# Patient Record
Sex: MALE | Race: BLACK OR AFRICAN AMERICAN | NOT HISPANIC OR LATINO | URBAN - METROPOLITAN AREA
[De-identification: names, ages, dates, MRNs, and addresses within clinical notes are randomized per-mention and may not be internally consistent; named-entity substitution may affect disease eponyms.]

---

## 2019-09-12 ENCOUNTER — HISTORICAL (OUTPATIENT)
Dept: ADMINISTRATIVE | Facility: HOSPITAL | Age: 39
End: 2019-09-12

## 2019-09-15 LAB — FINAL CULTURE: NORMAL

## 2021-02-04 ENCOUNTER — HISTORICAL (OUTPATIENT)
Dept: ADMINISTRATIVE | Facility: HOSPITAL | Age: 41
End: 2021-02-04

## 2021-02-06 LAB — FINAL CULTURE: NORMAL

## 2022-04-11 ENCOUNTER — HISTORICAL (OUTPATIENT)
Dept: ADMINISTRATIVE | Facility: HOSPITAL | Age: 42
End: 2022-04-11

## 2022-04-24 VITALS
SYSTOLIC BLOOD PRESSURE: 116 MMHG | WEIGHT: 190.06 LBS | HEIGHT: 68 IN | BODY MASS INDEX: 28.8 KG/M2 | DIASTOLIC BLOOD PRESSURE: 86 MMHG

## 2023-03-15 DIAGNOSIS — E23.2 DIABETES INSIPIDUS: Primary | ICD-10-CM

## 2023-03-21 ENCOUNTER — TELEPHONE (OUTPATIENT)
Dept: NEUROSURGERY | Facility: CLINIC | Age: 43
End: 2023-03-21
Payer: MEDICAID

## 2023-03-21 DIAGNOSIS — D49.7 PITUITARY TUMOR: Primary | ICD-10-CM

## 2023-03-21 NOTE — TELEPHONE ENCOUNTER
Jose called Dr. Odonnell's office to inquire about any recent imaging. Mira stated that there is no recent imaging so jose forwarded the call to me. I spoke with mira to inquire about the referral and if anything happened recently. She stated no, his mother is the one that is requesting the apt because he has not seen anyone in a while. I did request for her to see if Dr. Odonnell would order a MRI brain W/ WO. She stated she will relay the message to him. I will send myself a reminder to F/U on status of MRI.

## 2023-03-23 NOTE — TELEPHONE ENCOUNTER
Spoke with patients mother. Inquired about the MRI. She stated she is waiting for it to be scheduled. She stated she's not sure if the tumor is spreading but the patient's eye sight is getting really bad and he has no energy so she knows he needs this MRI soon. I told her I will f/u with her early next week to see if it is scheduled. She verbalized understanding.

## 2023-03-27 NOTE — TELEPHONE ENCOUNTER
Spoke with patients mother regarding MRI. She states it is not scheduled until 2024. I asked her if she told Dr. Odonnell's office that this was to see if his tumor has grown. She stated yes, they have been trying to tell him this. I will call Dr. Odonnell's office and speak with his nurse.

## 2023-03-27 NOTE — TELEPHONE ENCOUNTER
Spoke with Dulce at Dr. Odonnell's office. She stated no, they did not schedule it for next year. The only thing they had to do was get a PA for it but did send the orders to [facility]. She states she will get in touch with [facility] to confirm this. I will keep f/u with scheduling of MRI.

## 2023-03-29 NOTE — TELEPHONE ENCOUNTER
Spoke with patients mother. States [facility] did call her yesterday and advised her that the first order they received was the wrong one. She states [facility] should be giving her a call back today to schedule. I told her I will note that and f/u w/ her at the end of the week. She verbalized understanding.

## 2023-03-31 NOTE — TELEPHONE ENCOUNTER
Spoke with patients mother. Inquired about the status of MRI. She states [facility] did not call her yet and is still waiting. Advised her that I will keep F/U on this so I can send for review ASAP.

## 2023-04-03 NOTE — TELEPHONE ENCOUNTER
Patients mother gave me a call to inquire about the MRI. She was wondering if I had heard anything from Dr. Odonnell's office. I advised her that the last thing I heard was that they sent [facility] the wrong order and she was waiting to hear back from them to schedule. Advised I did not hear anything else regarding it. She states she will call Dr. Odonnell's office to ensure they did send the right order to [facility].

## 2023-04-05 NOTE — TELEPHONE ENCOUNTER
Mother returned my call. States patient is scheduled for MRI on 4/14/23. I advised her that I will send myself a reminder to f/u and send it for review. She verbalized understanding.

## 2023-04-17 NOTE — TELEPHONE ENCOUNTER
Called [facility] to get imaging from 4/14 pushed. She stated that the patient is scheduled at [facility] for 4/21/23 for upcoming MRI brain. I sent myself another reminder to f/u.

## 2023-04-24 NOTE — TELEPHONE ENCOUNTER
Spoke with someone at [facility] radiology. She states the only thing they have is from 2017. Patient did r/s MRI that was for 4/21. It is now scheduled for 5/1/23. Awaiting those images to be pushed and will send myself another reminder to f/u on results.

## 2023-05-05 ENCOUNTER — TELEPHONE (OUTPATIENT)
Dept: NEUROSURGERY | Facility: CLINIC | Age: 43
End: 2023-05-05
Payer: MEDICAID

## 2023-05-05 NOTE — TELEPHONE ENCOUNTER
Spoke with patients mother. Advised her of Donita's recommendations. She verbalized understanding so I scheduled patient for 8/7/23 at 11:00. I did give her our number so she can get our address once it gets closer.

## 2023-05-05 NOTE — TELEPHONE ENCOUNTER
Dr. Nieto reviewed the scan.  There is no recurrent tumor.  Give him a next available NP with me.  No new imaging.  I will arrange for the patient to see Dr. Huang in follow up.    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 MRI in 2015 showed tumor. Scans from 2017 (ordered by Hamida Conley) and 5/1/2023 (ordered by Dr. Odonnell). show postoperative findings. .

## 2023-10-19 ENCOUNTER — OFFICE VISIT (OUTPATIENT)
Dept: NEUROSURGERY | Facility: CLINIC | Age: 43
End: 2023-10-19
Payer: MEDICAID

## 2023-10-19 VITALS
HEIGHT: 67 IN | HEART RATE: 71 BPM | BODY MASS INDEX: 26.53 KG/M2 | SYSTOLIC BLOOD PRESSURE: 110 MMHG | DIASTOLIC BLOOD PRESSURE: 79 MMHG | WEIGHT: 169 LBS | RESPIRATION RATE: 16 BRPM

## 2023-10-19 DIAGNOSIS — D49.7 PITUITARY TUMOR: Primary | ICD-10-CM

## 2023-10-19 DIAGNOSIS — D49.7 PITUITARY TUMOR: ICD-10-CM

## 2023-10-19 PROCEDURE — 3074F SYST BP LT 130 MM HG: CPT | Mod: CPTII,,, | Performed by: PHYSICIAN ASSISTANT

## 2023-10-19 PROCEDURE — 3008F PR BODY MASS INDEX (BMI) DOCUMENTED: ICD-10-PCS | Mod: CPTII,,, | Performed by: PHYSICIAN ASSISTANT

## 2023-10-19 PROCEDURE — 99203 OFFICE O/P NEW LOW 30 MIN: CPT | Mod: ,,, | Performed by: PHYSICIAN ASSISTANT

## 2023-10-19 PROCEDURE — 3078F DIAST BP <80 MM HG: CPT | Mod: CPTII,,, | Performed by: PHYSICIAN ASSISTANT

## 2023-10-19 PROCEDURE — 1160F RVW MEDS BY RX/DR IN RCRD: CPT | Mod: CPTII,,, | Performed by: PHYSICIAN ASSISTANT

## 2023-10-19 PROCEDURE — 1159F PR MEDICATION LIST DOCUMENTED IN MEDICAL RECORD: ICD-10-PCS | Mod: CPTII,,, | Performed by: PHYSICIAN ASSISTANT

## 2023-10-19 PROCEDURE — 3008F BODY MASS INDEX DOCD: CPT | Mod: CPTII,,, | Performed by: PHYSICIAN ASSISTANT

## 2023-10-19 PROCEDURE — 3078F PR MOST RECENT DIASTOLIC BLOOD PRESSURE < 80 MM HG: ICD-10-PCS | Mod: CPTII,,, | Performed by: PHYSICIAN ASSISTANT

## 2023-10-19 PROCEDURE — 1160F PR REVIEW ALL MEDS BY PRESCRIBER/CLIN PHARMACIST DOCUMENTED: ICD-10-PCS | Mod: CPTII,,, | Performed by: PHYSICIAN ASSISTANT

## 2023-10-19 PROCEDURE — 99203 PR OFFICE/OUTPT VISIT, NEW, LEVL III, 30-44 MIN: ICD-10-PCS | Mod: ,,, | Performed by: PHYSICIAN ASSISTANT

## 2023-10-19 PROCEDURE — 1159F MED LIST DOCD IN RCRD: CPT | Mod: CPTII,,, | Performed by: PHYSICIAN ASSISTANT

## 2023-10-19 PROCEDURE — 3074F PR MOST RECENT SYSTOLIC BLOOD PRESSURE < 130 MM HG: ICD-10-PCS | Mod: CPTII,,, | Performed by: PHYSICIAN ASSISTANT

## 2023-10-19 NOTE — PROGRESS NOTES
[facility]  History & Physical  Neurosurgery      Ignacio Tamez Jr.   [id number]   1980     SUBJECTIVE:     Chief Complaint:  Visual disturbance      History of Present Illness:   Patient is a 43 y.o. right handed male is seen today for brain tumor.  In 2015, the patient developed blurry vision and headaches while he was in skilled nursing.  He was taken to [facility] for evaluation.  Imaging obtained at that time discovered pituitary tumor.  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  The patient reports that within a few months, he was seen in [place].  On 10/27/2015, he underwent transsphenoidal resection of pituitary tumor.  He was found to have a CSF leak.  He subsequently underwent repair of CSF leak on 11/19/2015.  Lumbar drain was placed at that time to aid with healing of the repair.  In reviewing the notes from care anywhere, the patient pulled out the lumbar drain.  Part of the catheter was retained within the thecal sac.  He then was found to have an infection at the lower back.  On 12/29/2015, he underwent L3-4 laminectomy with removal of the retained catheter in [place].  The patient's mother reports that he had an extensive stay in [place].    The patient has been under the care of Dr. Odonnell.  Dr. Odonnell has referred the patient here for evaluation due to a worsening in his vision and per patient request.  Previously, he was able to see enough to get around.  Approximately 1 month ago, his mother reports he lost all vision.  He is able to see light, but unable to count fingers.  He continues with headaches daily described as pounding and spinning.  The patient reports that since he has been unable to see, the spinning feels worse.  His last eye exam was a few years ago from what they were can recall.  He is awaiting an appointment to see an ophthalmologist.  He was previously seen by Dr. Hamida Menendez when she was practicing in [place].  He has not seen a neurologist  since she to [facility].    Of note, the patient reports he will no longer be seeing Dr. Odonnell.  He has an appointment with a new primary care provider, but is unable to recall the name.      Past Medical History:   Diagnosis Date    Diabetes insipidus     History of depression     HTN (hypertension)     Legally blind     Midline low back pain without sciatica     Pituitary adenoma         Past Surgical History:   Procedure Laterality Date    L3-4 LAMINECTOMY W/ INTRADURAL EXPLORATION & REMOVAL OF RETAINED CATHETER  12/29/2015    DIGIORGIO    REPAIR OF CSF LEAK- NASAL ENDOSCOPY & PLACEMENT OF GRAFT  11/19/2015    NEINER    TRANSSPHENOIDAL RESECTION OF PITUITARY TUMOR  10/27/2015    GLENN        Social History     Tobacco Use    Smoking status: Never    Smokeless tobacco: Never   Substance Use Topics    Alcohol use: Not on file        Family History   Problem Relation Age of Onset    Diabetes Mother     Glaucoma Father     Cancer Maternal Grandmother         Review of patient's allergies indicates:  No Known Allergies     Current Outpatient Medications   Medication Instructions    desmopressin (DDAVP) 0.2 MG tablet Oral, 3 times daily    ferrous sulfate (FEOSOL) 325 mg, Oral, with breakfast    hydrocortisone (CORTEF) 5 mg, Oral, 2 times daily    metFORMIN (GLUCOPHAGE) 500 mg, Oral, 2 times daily with meals       Review of Systems   Constitutional:  Positive for fatigue. Negative for chills and fever.   HENT:  Negative for nosebleeds and sore throat.    Eyes:  Positive for visual disturbance. Negative for pain.   Respiratory:  Negative for cough, chest tightness and shortness of breath.    Cardiovascular:  Negative for chest pain.   Gastrointestinal:  Negative for diarrhea, nausea and vomiting.   Genitourinary:  Negative for difficulty urinating, dysuria and hematuria.   Musculoskeletal:  Positive for gait problem. Negative for myalgias.   Skin:  Negative for rash.   Neurological:  Positive for dizziness, weakness and  "headaches. Negative for facial asymmetry.   Psychiatric/Behavioral:  Negative for confusion and sleep disturbance. The patient is not nervous/anxious.        OBJECTIVE:       Visit Vitals  /79 (BP Location: Right arm, Patient Position: Sitting)   Pulse 71   Resp 16   Ht 5' 7" (1.702 m)   Wt 76.7 kg (169 lb)   BMI 26.47 kg/m²        General:  Pleasant, Well-nourished, Well-groomed.    Head:  Normocephalic, atraumatic.  There is cataract present on the eye.    CV:  Neck is supple.  There are no carotid bruits.  Heart has regular rate and rhythm.    Lungs:  Lungs are clear to auscultation bilaterally with non-labored respirations.    Abdomen:  Soft, nontender, nondistended.    Neurological:    Oriented to Person, Place, Time   Speech: normal  Memory, cognition, and affect are appropriate.  Pupils are equal, round, and reactive to light.  Extraocular movements are intact.  Movements of facial expression are intact and symmetric.  Moving all extremities well.  Gait is requires assistance due to blindness.    Imaging:  MRI of the brain with and without contrast was obtained on 05/01/2023.  By report, this study shows stable postoperative findings when compared to scan from 10/10/2017.      ASSESSMENT:     1. Pituitary tumor  MRI Pituitary W W/O Contrast        PLAN:     Options were discussed at length with the patient and his mother.  I believe change in his vision is due to cataracts as opposed to a change in the pituitary gland.  Being the patient experienced a change in his vision in the past 1-2 months, we will obtain repeat MRI of the pituitary region to rule this out as a cause.  He will see Dr. Huang via telemedicine with that MRI.      A total of 43 minutes was spent face-to-face with the patient during this encounter.  Over half of that time was spent on counseling and coordination of care.  Additional time was used to review the patient's chart, discuss with Dr. Huang, and work on office note.  Chart " review included neurosurgery notes from [facility].        Donita Gardner PA-C      Disclaimer:  This note is prepared using voice recognition software and as such is likely to have errors despite attempts at proofreading.

## 2023-12-04 ENCOUNTER — TELEPHONE (OUTPATIENT)
Dept: NEUROSURGERY | Facility: CLINIC | Age: 43
End: 2023-12-04

## 2023-12-04 ENCOUNTER — OFFICE VISIT (OUTPATIENT)
Dept: NEUROSURGERY | Facility: CLINIC | Age: 43
End: 2023-12-04
Payer: MEDICAID

## 2023-12-04 VITALS — HEIGHT: 67 IN | WEIGHT: 169 LBS | BODY MASS INDEX: 26.53 KG/M2

## 2023-12-04 DIAGNOSIS — E89.3 STATUS POST TRANSSPHENOIDAL PITUITARY RESECTION: Primary | ICD-10-CM

## 2023-12-04 PROCEDURE — 99499 NO LOS: ICD-10-PCS | Mod: 95,,, | Performed by: STUDENT IN AN ORGANIZED HEALTH CARE EDUCATION/TRAINING PROGRAM

## 2023-12-04 PROCEDURE — 99499 UNLISTED E&M SERVICE: CPT | Mod: 95,,, | Performed by: STUDENT IN AN ORGANIZED HEALTH CARE EDUCATION/TRAINING PROGRAM

## 2023-12-04 NOTE — TELEPHONE ENCOUNTER
The patients mother called the office this morning in regards to the patients virtual he has scheduled today with Dr. Huang. She stated they are trying to log in to his account but it is saying its . I did advise her that they will not be able to check in until it gets a little closer to the appointment time. I did advise her that the MA will call them about 10 min prior to get everything updated and they can log in at that time. She stated she has no idea how to do that, she is not very good with technology. I advised her if she cannot get it to work, we can maybe just do a regular phone call. She stated that would be better. I told her I would relay this to Dr. Huang' MA so she can be aware and we will be back in touch if anything is needed.   no

## 2023-12-04 NOTE — PROGRESS NOTES
Established Patient - Audio Only Telehealth Visit     The chief complaint leading to consultation is: Visual disturbance  Visit type: Virtual visit with audio only (telephone)  Total time spent with patient: 10 min    The reason for the audio only service rather than synchronous audio and video virtual visit was related to technical difficulties or patient preference/necessity.     Each patient to whom I provide medical services by telemedicine is:  (1) informed of the relationship between the physician and patient and the respective role of any other health care provider with respect to management of the patient; and (2) notified that they may decline to receive medical services by telemedicine and may withdraw from such care at any time. Patient verbally consented to receive this service via voice-only telephone call.    HPI: Patient continues with visual complaints. No vision on R (baseline). L eye vision has gotten worse. Assessed by ophthalmologist, scheduled for cataract surgery in April.   MRI pituitary was reviewed. Overall stable compared to 2017.     Assessment and plan:  44yo M w history of transsphenoidal resection of pituitary tumor. Worsening vision likely related to cataract. Ophthalmology planning on Surgery.  MRI pituitary with stable findings. Will see him back in 6 months with repeat pituitary MRI.                        This service was not originating from a related E/M service provided within the previous 7 days nor will  to an E/M service or procedure within the next 24 hours or my soonest available appointment.  Prevailing standard of care was able to be met in this audio-only visit.

## 2023-12-20 ENCOUNTER — TELEPHONE (OUTPATIENT)
Dept: NEUROSURGERY | Facility: CLINIC | Age: 43
End: 2023-12-20
Payer: MEDICAID

## 2023-12-20 NOTE — TELEPHONE ENCOUNTER
I informed the pt and his mom of Dr. Huang' recommendation and have faxed this information over to the pts PCP.

## 2024-05-31 ENCOUNTER — HOSPITAL ENCOUNTER (EMERGENCY)
Facility: HOSPITAL | Age: 44
Discharge: HOME | End: 2024-05-31
Attending: STUDENT IN AN ORGANIZED HEALTH CARE EDUCATION/TRAINING PROGRAM
Payer: MEDICAID

## 2024-05-31 VITALS
HEART RATE: 69 BPM | SYSTOLIC BLOOD PRESSURE: 124 MMHG | TEMPERATURE: 97 F | OXYGEN SATURATION: 99 % | RESPIRATION RATE: 16 BRPM | BODY MASS INDEX: 27.72 KG/M2 | DIASTOLIC BLOOD PRESSURE: 83 MMHG | WEIGHT: 177 LBS

## 2024-05-31 DIAGNOSIS — R07.9 CHEST PAIN: ICD-10-CM

## 2024-05-31 DIAGNOSIS — R06.02 SOB (SHORTNESS OF BREATH): ICD-10-CM

## 2024-05-31 DIAGNOSIS — R07.9 CHEST PAIN, UNSPECIFIED TYPE: Primary | ICD-10-CM

## 2024-05-31 LAB
ALBUMIN SERPL-MCNC: 4.3 G/DL (ref 3.5–5)
ALBUMIN/GLOB SERPL: 1.3 RATIO (ref 1.1–2)
ALP SERPL-CCNC: 55 UNIT/L (ref 40–150)
ALT SERPL-CCNC: 11 UNIT/L (ref 0–55)
ANION GAP SERPL CALC-SCNC: 10 MEQ/L
AST SERPL-CCNC: 18 UNIT/L (ref 5–34)
BASOPHILS # BLD AUTO: 0.03 X10(3)/MCL
BASOPHILS NFR BLD AUTO: 0.6 %
BILIRUB SERPL-MCNC: 0.2 MG/DL
BNP BLD-MCNC: <10 PG/ML
BUN SERPL-MCNC: 16.6 MG/DL (ref 8.9–20.6)
CALCIUM SERPL-MCNC: 9.2 MG/DL (ref 8.4–10.2)
CHLORIDE SERPL-SCNC: 105 MMOL/L (ref 98–107)
CO2 SERPL-SCNC: 24 MMOL/L (ref 22–29)
CREAT SERPL-MCNC: 1.65 MG/DL (ref 0.73–1.18)
CREAT/UREA NIT SERPL: 10
EOSINOPHIL # BLD AUTO: 0.08 X10(3)/MCL (ref 0–0.9)
EOSINOPHIL NFR BLD AUTO: 1.5 %
ERYTHROCYTE [DISTWIDTH] IN BLOOD BY AUTOMATED COUNT: 17.7 % (ref 11.5–17)
GFR SERPLBLD CREATININE-BSD FMLA CKD-EPI: 52 ML/MIN/1.73/M2
GLOBULIN SER-MCNC: 3.4 GM/DL (ref 2.4–3.5)
GLUCOSE SERPL-MCNC: 105 MG/DL (ref 74–100)
HCT VFR BLD AUTO: 30.9 % (ref 42–52)
HGB BLD-MCNC: 10.1 G/DL (ref 14–18)
IMM GRANULOCYTES # BLD AUTO: 0.03 X10(3)/MCL (ref 0–0.04)
IMM GRANULOCYTES NFR BLD AUTO: 0.6 %
LYMPHOCYTES # BLD AUTO: 2.14 X10(3)/MCL (ref 0.6–4.6)
LYMPHOCYTES NFR BLD AUTO: 39.6 %
MCH RBC QN AUTO: 23.4 PG (ref 27–31)
MCHC RBC AUTO-ENTMCNC: 32.7 G/DL (ref 33–36)
MCV RBC AUTO: 71.5 FL (ref 80–94)
MONOCYTES # BLD AUTO: 0.33 X10(3)/MCL (ref 0.1–1.3)
MONOCYTES NFR BLD AUTO: 6.1 %
NEUTROPHILS # BLD AUTO: 2.79 X10(3)/MCL (ref 2.1–9.2)
NEUTROPHILS NFR BLD AUTO: 51.6 %
NRBC BLD AUTO-RTO: 0 %
OHS QRS DURATION: 80 MS
OHS QTC CALCULATION: 460 MS
PLATELET # BLD AUTO: 308 X10(3)/MCL (ref 130–400)
PMV BLD AUTO: 9.4 FL (ref 7.4–10.4)
POTASSIUM SERPL-SCNC: 4.2 MMOL/L (ref 3.5–5.1)
PROT SERPL-MCNC: 7.7 GM/DL (ref 6.4–8.3)
RBC # BLD AUTO: 4.32 X10(6)/MCL (ref 4.7–6.1)
SODIUM SERPL-SCNC: 139 MMOL/L (ref 136–145)
TROPONIN I SERPL-MCNC: <0.01 NG/ML (ref 0–0.04)
TROPONIN I SERPL-MCNC: <0.01 NG/ML (ref 0–0.04)
WBC # SPEC AUTO: 5.4 X10(3)/MCL (ref 4.5–11.5)

## 2024-05-31 PROCEDURE — 93010 ELECTROCARDIOGRAM REPORT: CPT | Mod: ,,, | Performed by: INTERNAL MEDICINE

## 2024-05-31 PROCEDURE — 83880 ASSAY OF NATRIURETIC PEPTIDE: CPT | Performed by: PHYSICIAN ASSISTANT

## 2024-05-31 PROCEDURE — 93005 ELECTROCARDIOGRAM TRACING: CPT

## 2024-05-31 PROCEDURE — 84484 ASSAY OF TROPONIN QUANT: CPT | Performed by: PHYSICIAN ASSISTANT

## 2024-05-31 PROCEDURE — 85025 COMPLETE CBC W/AUTO DIFF WBC: CPT | Performed by: PHYSICIAN ASSISTANT

## 2024-05-31 PROCEDURE — 99285 EMERGENCY DEPT VISIT HI MDM: CPT | Mod: 25

## 2024-05-31 PROCEDURE — 80053 COMPREHEN METABOLIC PANEL: CPT | Performed by: PHYSICIAN ASSISTANT

## 2024-05-31 RX ORDER — ALBUTEROL SULFATE 90 UG/1
2 AEROSOL, METERED RESPIRATORY (INHALATION) EVERY 6 HOURS PRN
Qty: 18 G | Refills: 0 | Status: SHIPPED | OUTPATIENT
Start: 2024-05-31 | End: 2025-05-31

## 2024-05-31 NOTE — FIRST PROVIDER EVALUATION
Medical screening examination initiated.  I have conducted a focused provider triage encounter, findings are as follows:    Brief history of present illness:  43yoAAM with chest pain and SOB centrally located congestion x3 or 4 months. Went to [facility] on 5/25/24 for same complaint and referral made to [facility] at that time. Mother present provides majority of the history. PMH benign brain tumor and loss of vision.     There were no vitals filed for this visit.    Pertinent physical exam:  NAD    Brief workup plan:  labs and imaging.     Preliminary workup initiated; this workup will be continued and followed by the physician or advanced practice provider that is assigned to the patient when roomed.

## 2024-05-31 NOTE — ED PROVIDER NOTES
Encounter Date: 5/31/2024    SCRIBE #1 NOTE: I, Marlena Mujica, am scribing for, and in the presence of,  Jaun Orellana MD. I have scribed the entire note.       History     Chief Complaint   Patient presents with    Chest Pain    Shortness of Breath     CP,sob X 4 mos, SOB worse last night. was seen last sat in [facility] and ID. Scheduled to see [facility] next month. PMH Brain tumor     43 year old male with a hx of HTN presents to the ED for chest pain. Pt states over the past few weeks he has experienced episodes of chest pressure and and SOB. Pt also notes some mild dizziness. Pt states he was seen for similar symptoms last weekend at [facility]. Pt states he had a normal workup and was discharged. Pt states he is resting comfortably at this time.     The history is provided by the patient. No  was used.     Review of patient's allergies indicates:  No Known Allergies  Past Medical History:   Diagnosis Date    Diabetes insipidus     History of depression     HTN (hypertension)     Legally blind     Midline low back pain without sciatica     Pituitary adenoma      Past Surgical History:   Procedure Laterality Date    L3-4 LAMINECTOMY W/ INTRADURAL EXPLORATION & REMOVAL OF RETAINED CATHETER  12/29/2015    DIGIORGIO    REPAIR OF CSF LEAK- NASAL ENDOSCOPY & PLACEMENT OF GRAFT  11/19/2015    NEINER    TRANSSPHENOIDAL RESECTION OF PITUITARY TUMOR  10/27/2015    GLENN     Family History   Problem Relation Name Age of Onset    Diabetes Mother      Glaucoma Father      Cancer Maternal Grandmother       Social History     Tobacco Use    Smoking status: Never    Smokeless tobacco: Never     Review of Systems   Respiratory:  Positive for shortness of breath.    Cardiovascular:  Positive for chest pain.   Neurological:  Positive for dizziness.       Physical Exam     Initial Vitals [05/31/24 1618]   BP Pulse Resp Temp SpO2   120/81 75 18 97.2 °F (36.2 °C) 100 %      MAP       --         Physical Exam    Constitutional:  He appears well-developed and well-nourished. He is not diaphoretic. No distress.   HENT:   Head: Normocephalic and atraumatic.   Right Ear: External ear normal.   Left Ear: External ear normal.   Nose: Nose normal.   Poor dentition   Eyes: EOM are normal. Pupils are equal, round, and reactive to light. Right eye exhibits no discharge. Left eye exhibits no discharge.   Cardiovascular:  Normal rate, regular rhythm and normal heart sounds.     Exam reveals no gallop and no friction rub.       No murmur heard.  Pulmonary/Chest: Effort normal and breath sounds normal. No respiratory distress. He has no wheezes. He has no rhonchi. He has no rales. He exhibits tenderness (Palpation reproduces symptoms).   Abdominal: Abdomen is soft. Bowel sounds are normal. He exhibits no distension and no mass. There is no abdominal tenderness. There is no rebound and no guarding.   Musculoskeletal:         General: No edema. Normal range of motion.     Neurological: He is alert and oriented to person, place, and time. No cranial nerve deficit or sensory deficit.   Skin: Skin is warm and dry. Capillary refill takes less than 2 seconds.         ED Course   Procedures  Labs Reviewed   COMPREHENSIVE METABOLIC PANEL - Abnormal; Notable for the following components:       Result Value    Glucose 105 (*)     Creatinine 1.65 (*)     All other components within normal limits   CBC WITH DIFFERENTIAL - Abnormal; Notable for the following components:    RBC 4.32 (*)     Hgb 10.1 (*)     Hct 30.9 (*)     MCV 71.5 (*)     MCH 23.4 (*)     MCHC 32.7 (*)     RDW 17.7 (*)     All other components within normal limits   TROPONIN I - Normal   TROPONIN I - Normal   B-TYPE NATRIURETIC PEPTIDE - Normal   CBC W/ AUTO DIFFERENTIAL    Narrative:     The following orders were created for panel order CBC auto differential.  Procedure                               Abnormality         Status                     ---------                               -----------          ------                     CBC with Differential[[id number]]       Abnormal            Final result                 Please view results for these tests on the individual orders.          Imaging Results              X-Ray Chest AP Portable (Final result)  Result time 05/31/24 16:37:37      Final result by Maggy Pierre MD (05/31/24 16:37:37)                   Impression:      No acute cardiopulmonary abnormality.      Electronically signed by: Maggy Pierre  Date:    05/31/2024  Time:    16:37               Narrative:    EXAMINATION:  XR CHEST AP PORTABLE    CLINICAL HISTORY:  Chest Pain;    TECHNIQUE:  Single frontal view of the chest was performed.    COMPARISON:  11/23/2015    FINDINGS:  LINES AND TUBES: None    MEDIASTINUM AND JOHNNY: The cardiac silhouette is normal.    LUNGS: No lobar consolidation. No edema.    PLEURA:No pleural effusion. No pneumothorax.    BONES: No acute osseous abnormality.                                       Medications - No data to display  Medical Decision Making  The differential diagnosis includes, but is not limited to, COPD, asthma, pneumonia, viral syndrome, PE, pneumothorax, congestive heart failure, CAD, MI, pericarditis, anemia, electrolyte abnormality, hypotension, pleural effusion    See ED course for MDM    Amount and/or Complexity of Data Reviewed  External Data Reviewed: notes.     Details: See ED course  Labs: ordered. Decision-making details documented in ED Course.  Radiology: ordered and independent interpretation performed. Decision-making details documented in ED Course.  ECG/medicine tests: ordered and independent interpretation performed. Decision-making details documented in ED Course.    Risk  OTC drugs.  Prescription drug management.            Scribe Attestation:   Scribe #1: I performed the above scribed service and the documentation accurately describes the services I performed. I attest to the accuracy of the note.    Attending Attestation:            Physician Attestation for Scribe:  Physician Attestation Statement for Scribe #1: I, Jaun Orellana MD, reviewed documentation, as scribed by Marlena Mujica in my presence, and it is both accurate and complete.             ED Course as of 05/31/24 1733   Fri May 31, 2024   1631 Chart review reveals that patient was seen at outside hospital 05/25/2024.  Diagnosed with atypical chest pain, dyspnea, hyponatremia, CKD.  Per note 43-year-old male history of benign brain tumor presented with chief complaint of having constant chest pain for a week.  Also complain of dyspnea on exertion.  Pain worse with twisting and turning. [MM]   1633 EKG from 16 15 shows sinus rhythm rate of 75 .  Normal axis.  No ST elevation or depression. [MM]   1653 X-Ray Chest AP Portable  Negative for acute. [MM]   1721 Troponin I: <0.010 [MM]   1721 BNP: <10.0 [MM]   1721 CBC auto differential(!)  Stable anemia no leukocytosis. [MM]   1721 Comprehensive metabolic panel(!) [MM]   1721 Comprehensive metabolic panel(!)  No significant electrolyte abnormality or renal dysfunction. [MM]   1721 X-Ray Chest AP Portable  No acute process. [MM]   1726 Patient ambulated with tach around emergency department.  Oxygen saturation remained at 100% on room air. [MM]   1726 On re-evaluation patient was resting comfortably.  NAD.  Discussed never results.  I believe he was suitable for discharge home.  Possibly some mild wheezing on re-evaluation will discharge with albuterol inhaler.  He was has follow up with the Cardiology.  Encouraged to keep this appointment.  Suitable for discharge at this time. Return precautions given.  Questions invited, questions answered to the best my ability.  Patient discharged home condition stable.   [MM]      ED Course User Index  [MM] Jaun Orellana MD                           Clinical Impression:  Final diagnoses:  [R07.9] Chest pain  [R07.9] Chest pain, unspecified type (Primary)  [R06.02] SOB (shortness  of breath)          ED Disposition Condition    Discharge Stable          ED Prescriptions       Medication Sig Dispense Start Date End Date Auth. Provider    albuterol (PROAIR HFA) 90 mcg/actuation inhaler Inhale 2 puffs into the lungs every 6 (six) hours as needed for Wheezing. Rescue 18 g 5/31/2024 5/31/2025 Jaun Orellana MD          Follow-up Information       Follow up With Specialties Details Why Contact Info    Susan Wiseman NP Family Medicine Call   [address] [facility] [address]  [phone]               Jaun Orellana MD  05/31/24 9031

## 2024-05-31 NOTE — DISCHARGE INSTRUCTIONS

## 2024-06-04 ENCOUNTER — TELEPHONE (OUTPATIENT)
Dept: NEUROSURGERY | Facility: CLINIC | Age: 44
End: 2024-06-04
Payer: MEDICAID

## 2024-06-04 NOTE — TELEPHONE ENCOUNTER
I called pt to advise about MRI Pituitary that was supposed to be done before appointment with Dr. Huang. I called pt no answer lvm and I called alternate contacts no answer couldn't lvm.

## 2024-07-25 ENCOUNTER — TELEPHONE (OUTPATIENT)
Dept: NEUROSURGERY | Facility: CLINIC | Age: 44
End: 2024-07-25
Payer: MEDICAID

## 2024-07-26 NOTE — TELEPHONE ENCOUNTER
I called patient to reschedule patient appointment  on 7/29/24 per  until after MRI Pituitary is done. I spoke with patient Father Agatha and advised about patient MRI Pituitary. Patient father Agatha stated his wife handles patient appointments. I advised patient Father Agatha that our office has been unsuccessful trying to contact patient Mother Briana he then stated she had a different phone number but doesn't remember the number off hand. Patient father then asked can I give him a call back after 3:00 when he gets off work to speak with his wife regarding patient appointment. I will cancel patient appointment for 7/29/24 with  until MRI is done and reschedule appointment with patient mother when I speak to her.